# Patient Record
Sex: FEMALE | Race: WHITE | NOT HISPANIC OR LATINO | URBAN - METROPOLITAN AREA
[De-identification: names, ages, dates, MRNs, and addresses within clinical notes are randomized per-mention and may not be internally consistent; named-entity substitution may affect disease eponyms.]

---

## 2019-05-15 ENCOUNTER — OFFICE (OUTPATIENT)
Dept: URBAN - METROPOLITAN AREA CLINIC 75 | Facility: CLINIC | Age: 59
End: 2019-05-15

## 2019-05-15 VITALS
DIASTOLIC BLOOD PRESSURE: 84 MMHG | SYSTOLIC BLOOD PRESSURE: 126 MMHG | RESPIRATION RATE: 15 BRPM | HEART RATE: 86 BPM | HEIGHT: 65 IN | WEIGHT: 167 LBS

## 2019-05-15 DIAGNOSIS — R19.4 CHANGE IN BOWEL HABIT: ICD-10-CM

## 2019-05-15 DIAGNOSIS — R63.0 ANOREXIA: ICD-10-CM

## 2019-05-15 DIAGNOSIS — K59.00 CONSTIPATION, UNSPECIFIED: ICD-10-CM

## 2019-05-15 DIAGNOSIS — G20 PARKINSON'S DISEASE: ICD-10-CM

## 2019-05-15 DIAGNOSIS — R11.0 NAUSEA: ICD-10-CM

## 2019-05-15 PROCEDURE — 99245 OFF/OP CONSLTJ NEW/EST HI 55: CPT | Performed by: INTERNAL MEDICINE

## 2019-05-16 VITALS
DIASTOLIC BLOOD PRESSURE: 83 MMHG | OXYGEN SATURATION: 100 % | TEMPERATURE: 96.8 F | OXYGEN SATURATION: 84 % | SYSTOLIC BLOOD PRESSURE: 105 MMHG | HEART RATE: 121 BPM | RESPIRATION RATE: 16 BRPM | DIASTOLIC BLOOD PRESSURE: 82 MMHG | SYSTOLIC BLOOD PRESSURE: 120 MMHG | HEART RATE: 94 BPM | TEMPERATURE: 97 F | DIASTOLIC BLOOD PRESSURE: 53 MMHG | SYSTOLIC BLOOD PRESSURE: 142 MMHG | HEART RATE: 78 BPM | SYSTOLIC BLOOD PRESSURE: 118 MMHG | OXYGEN SATURATION: 97 % | WEIGHT: 167 LBS | HEART RATE: 112 BPM | DIASTOLIC BLOOD PRESSURE: 79 MMHG | SYSTOLIC BLOOD PRESSURE: 116 MMHG | RESPIRATION RATE: 13 BRPM | HEART RATE: 93 BPM | DIASTOLIC BLOOD PRESSURE: 95 MMHG | OXYGEN SATURATION: 93 % | HEIGHT: 65 IN

## 2019-05-20 ENCOUNTER — OFFICE (OUTPATIENT)
Dept: URBAN - METROPOLITAN AREA PATHOLOGY 4 | Facility: PATHOLOGY | Age: 59
End: 2019-05-20
Payer: COMMERCIAL

## 2019-05-20 ENCOUNTER — AMBULATORY SURGICAL CENTER (OUTPATIENT)
Dept: URBAN - METROPOLITAN AREA SURGERY 17 | Facility: SURGERY | Age: 59
End: 2019-05-20

## 2019-05-20 DIAGNOSIS — R11.0 NAUSEA: ICD-10-CM

## 2019-05-20 DIAGNOSIS — R63.0 ANOREXIA: ICD-10-CM

## 2019-05-20 LAB
GI HISTOLOGY: A. UNSPECIFIED: (no result)
GI HISTOLOGY: PDF REPORT: (no result)

## 2019-05-20 PROCEDURE — 88342 IMHCHEM/IMCYTCHM 1ST ANTB: CPT | Performed by: INTERNAL MEDICINE

## 2019-05-20 PROCEDURE — 43239 EGD BIOPSY SINGLE/MULTIPLE: CPT | Performed by: INTERNAL MEDICINE

## 2019-05-20 PROCEDURE — 88305 TISSUE EXAM BY PATHOLOGIST: CPT | Performed by: INTERNAL MEDICINE

## 2019-05-20 NOTE — SERVICEHPINOTES
May 15 2019. Patient here for evaluation. She had recent CT imaging which was negative for any evidence of malignancy or metastatic disease. Patient has had some unintentional weight loss. CAT scan did show some mild descending, sigmoid diverticular disease. Liver, gallbladder, pancreas, spleen looked normal. She does have a mildly decreased TSH and elevated free T4.BRPatient had CN about 6 yrs ago @ [facility].